# Patient Record
Sex: FEMALE | Race: WHITE | NOT HISPANIC OR LATINO | Employment: OTHER | URBAN - METROPOLITAN AREA
[De-identification: names, ages, dates, MRNs, and addresses within clinical notes are randomized per-mention and may not be internally consistent; named-entity substitution may affect disease eponyms.]

---

## 2024-03-05 PROCEDURE — 88368 INSITU HYBRIDIZATION MANUAL: CPT

## 2024-03-05 PROCEDURE — 88305 TISSUE EXAM BY PATHOLOGIST: CPT

## 2024-03-05 PROCEDURE — 88305 TISSUE EXAM BY PATHOLOGIST: CPT | Performed by: STUDENT IN AN ORGANIZED HEALTH CARE EDUCATION/TRAINING PROGRAM

## 2024-03-05 PROCEDURE — 88368 INSITU HYBRIDIZATION MANUAL: CPT | Performed by: STUDENT IN AN ORGANIZED HEALTH CARE EDUCATION/TRAINING PROGRAM

## 2024-03-12 ENCOUNTER — LAB REQUISITION (OUTPATIENT)
Dept: LAB | Facility: HOSPITAL | Age: 79
End: 2024-03-12
Payer: COMMERCIAL

## 2024-03-25 LAB
CLINICAL SIG UPDATED INFO: NORMAL
LAB AP ASR DISCLAIMER: NORMAL
LABORATORY COMMENT REPORT: NORMAL
PATH REPORT.FINAL DX SPEC: NORMAL
PATH REPORT.TOTAL CANCER: NORMAL

## 2024-05-03 PROCEDURE — 77300 RADIATION THERAPY DOSE PLAN: CPT | Performed by: RADIOLOGY

## 2024-05-03 PROCEDURE — 77295 3-D RADIOTHERAPY PLAN: CPT | Performed by: RADIOLOGY

## 2024-05-03 PROCEDURE — 77334 RADIATION TREATMENT AID(S): CPT | Performed by: RADIOLOGY

## 2024-05-06 PROCEDURE — 77280 THER RAD SIMULAJ FIELD SMPL: CPT | Performed by: RADIOLOGY

## 2024-05-06 PROCEDURE — 77427 RADIATION TX MANAGEMENT X5: CPT | Performed by: RADIOLOGY

## 2024-05-07 PROCEDURE — 77331 SPECIAL RADIATION DOSIMETRY: CPT | Performed by: RADIOLOGY

## 2024-05-07 PROCEDURE — 77014 CHG CT GUIDANCE RADIATION THERAPY FLDS PLACEMENT: CPT | Performed by: RADIOLOGY

## 2024-05-08 PROCEDURE — 77014 CHG CT GUIDANCE RADIATION THERAPY FLDS PLACEMENT: CPT | Performed by: RADIOLOGY

## 2024-05-09 PROCEDURE — 77014 CHG CT GUIDANCE RADIATION THERAPY FLDS PLACEMENT: CPT | Performed by: RADIOLOGY

## 2024-05-10 PROCEDURE — 77014 CHG CT GUIDANCE RADIATION THERAPY FLDS PLACEMENT: CPT | Performed by: RADIOLOGY

## 2024-08-21 NOTE — PROGRESS NOTES
History of Present Illness:  Elicia Elizabeth is a 78 y.o. female with a personal history of cancer.  Elicia Elizabeth was referred to the Cancer Genetics Clinic at [facility] by Dr. Stone at [facility]. Elicia Elizabeth is interested in genetic testing to clarify their personal risk for cancer, as well as the risks to their family members.    Cancer Medical History:  Personal history of cancer? Yes   Type: Breast     Age at diagnosis: 78  Summary: Ms. Elizabeth was diagnosed on 3/6/24 with right breast IDC (ER/OR+, HER2-). She is s/p right lumpectomy on 3/25/24. She reports having 5 days of RT. She did not have chemo and deferred AI.    History of other cancers: Yes, per patient she has a distant past (~, age 60) of left breast cancer (treated in [place], [place]) and is s/p surgery, RT, and 5 years of AI.    Prior genetic testing? No     Cancer screening history:  Colonoscopy? Yes. Per patient she reports 2 colonoscopies. She reports a cumulative history of < or = 5 polyps (no records to confirm)   Other cancer screening? Ms. Elizabeth reports a h/o BSO (h/o PCOS). She reports a h/o ~1 year of fertility treatments    Other potential exposures:  No known JOSE exposure    Family history:  A 4-generation pedigree was obtained and was significant for the following:   Two sons (living, ages 49 and 45) and one daughter (living, 42) without cancer history  Sister (living, 70) with a h/o thyroid cancer at an age <50 (s/p thyroidectomy and radiation)  4 other sisters (all living ages 71-76) without cancer history  Mother (, 80) without cancer history  3+ maternal aunts who lived >50 without cancer history (note we have limited information on maternal aunts/uncles)  Both maternal grandparents lived >50 without cancer history  Father (, 74) without cancer history  Two paternal aunts who lived >50 without cancer history  Paternal grandmother lived >50 without cancer history  Maternal ancestry is English.  Paternal ancestry is  English. There is no known Ash[employer]azi [employer] ancestry. Consanguinity was denied.       Discussion:  Elicia Elizabeth is a 78 y.o. old female with a personal history of breast cancer.  Based on her personal history of bilateral breast cancers at any age, Elicia Elizabeth meets NCCN criteria for testing of the BRCA1 and BRCA2 genes. She is interested in testing, which is recommended, and was ordered today via the 71-gene CancerNext-Expanded + [employer] panel from [employer]. Our discussion is summarized below.    We reviewed genes and chromosomes, inherited forms of breast and ovarian cancer, and the BRCA1 and BRCA2 genes causing HBOC. We discussed that most cancers are not due to an inherited genetic susceptibility. However, in about 5-10% of families, there is an inherited genetic mutation that can make a person more susceptible to developing certain forms of cancer. Within these families, we often see multiple family members with cancer, occurring in multiple generations. In addition, earlier onset and bilateral cancers are suggestive of an inherited form of cancer. Finally, there is a clustering of certain types of cancer in these families, such as breast and ovarian cancer.    We discussed the BRCA1 and BRCA2 genes, which are two genes that have been linked to early-onset breast and/or ovarian cancer. Changes in these genes (sometimes referred to as mutations) are inherited in a dominant pattern and confer >60% lifetime risk for breast cancer. This is elevated compared to the general population risk of 13%. In addition, BRCA1 and BRCA2 mutation carriers have up to a 58% lifetime risk for ovarian cancer, which is elevated over the 1.3% general population risk. Mutation carriers who have already been diagnosed with cancer have an increased risk to develop a second, contralateral breast cancer. BRCA2 gene mutation carriers have an increased risk for male breast cancer, prostate cancer, melanoma and pancreatic cancer.  Please note that our risk estimates and recommendations only go until the age of 75. After that, all care should be individualized with your providers.    We discussed that there are multiple genes associated with increased breast cancer risk. Some genes, like the BRCA1 and BRCA2 genes, are considered highly penetrant breast cancer genes, meaning a mutation in the gene confers a high risk of breast cancer. Additionally, there are other intermediate (moderate risk) breast cancer genes. For some of the moderate risk genes, there is often limited information regarding the degree to which a mutation in the gene affects risk of different types of cancers. Additionally, for some of these moderate risk genes, the appropriate management for individuals who have a mutation in one of these genes is not always clear. Our knowledge about the cancer risks associated with mutations in these moderate risk genes is always growing, and we will likely be able to provide more comprehensive information in the future.     Gene mutations in most cancer risk genes, i.e. BRCA1 and BRCA2, are inherited in an autosomal dominant fashion. This means that if an individual has a change in either of these genes, their siblings, parents, and children have a 50% chance of also having that gene change and a 50% chance of not having the gene change.     We reviewed the three results we can get back:  1. Positive- Identified a change in a cancer gene that confers an increased cancer risk. We will discuss potential changes in management for her and her family based on the specific gene mutation found.  2. Negative-Clears her for the cancer predisposition syndrome we assessed, but cannot clear her for all cancer predisposition risks. She would continue to be screened based on her personal and family history.  3. Variant of Uncertain Significance (VUS) - We discussed should an uncertain result come back that this would be treated like a negative result  (i.e. no management recommendation will be made no familial variant testing) as the implications of this finding are currently unknown.    Lastly, we discussed the Genetic Information Non-discrimination Act ([place]) of 2008. We discussed that per this federal law, employers (at companies with 15 employees or greater) and health insurance companies (barring  and other  insurances) are forbidden to ask for and use genetic information against another person. As such, health insurance companies cannot ask for genetic information and use findings affect coverage or rates. However, luxury insurances such as life insurance, long term care insurance, and/or private disability insurance companies are not forbidden against using genetic information when an individual takes out a new/additional policy in one of those areas. As such, for unaffected individuals it could be beneficial to explore/take out policies in luxury insurance areas PRIOR to undergoing genetic testing.    Elicia Elizabeth was counseled about hereditary cancer susceptibility including cancer risks, options for increased screening and/or risk reduction, genetic testing, and the implications for other family members. We discussed performing genetic testing in the context of a multi-gene panel test that looks at the BRCA1 and BRCA2, as well as moderate penetrant genes. She is interested in this approach, which is recommended and was ordered today via the 71-gene CancerNext-Expanded + [employer] panel from [employer].    Results are typically available within 4 weeks, and Elicia Elizabeth will return to the Cancer Genetics Clinic to discuss her testing results. At that time, we will make recommendations for both Elicia Elizabeth and her family members in terms of cancer screening and/or cancer risk reduction options.         PLAN:  1.  Elicia Elizabeth elected to undergo genetic testing via a panel test that analyzes 71 genes associated with breast, ovarian and  other cancer risks. Consent for testing was signed and blood was drawn. The sample was sent to [employer] [facility] for analysis. Results are typically available in 4 weeks.    2. Elicia Elizabeth will return to the Cancer Genetics Clinic in approximately 4 weeks to discuss her test results.     3. We remain available to Elicia Elizabeth or her family members at [phone] if any questions arise regarding information discussed at today's visit.    Anahi Bhakta MS, [facility]  Certified Genetic Counselor  [facility]  [phone]    Reviewed by:  Dr. Judi Yañez  Clinical   [facility]  [phone]

## 2024-08-30 ENCOUNTER — CLINICAL SUPPORT (OUTPATIENT)
Dept: GENETICS | Facility: CLINIC | Age: 79
End: 2024-08-30
Payer: COMMERCIAL

## 2024-08-30 ENCOUNTER — LAB (OUTPATIENT)
Dept: LAB | Facility: CLINIC | Age: 79
End: 2024-08-30
Payer: COMMERCIAL

## 2024-08-30 VITALS
BODY MASS INDEX: 34.93 KG/M2 | WEIGHT: 203.48 LBS | RESPIRATION RATE: 16 BRPM | HEART RATE: 71 BPM | DIASTOLIC BLOOD PRESSURE: 71 MMHG | OXYGEN SATURATION: 90 % | SYSTOLIC BLOOD PRESSURE: 110 MMHG | TEMPERATURE: 96.8 F

## 2024-08-30 DIAGNOSIS — C50.911 MALIGNANT NEOPLASM OF RIGHT BREAST IN FEMALE, ESTROGEN RECEPTOR POSITIVE, UNSPECIFIED SITE OF BREAST (MULTI): ICD-10-CM

## 2024-08-30 DIAGNOSIS — Z71.83 ENCOUNTER FOR NONPROCREATIVE GENETIC COUNSELING: ICD-10-CM

## 2024-08-30 DIAGNOSIS — Z85.3 PERSONAL HISTORY OF MALIGNANT NEOPLASM OF BREAST: ICD-10-CM

## 2024-08-30 DIAGNOSIS — Z17.0 MALIGNANT NEOPLASM OF RIGHT BREAST IN FEMALE, ESTROGEN RECEPTOR POSITIVE, UNSPECIFIED SITE OF BREAST (MULTI): ICD-10-CM

## 2024-08-30 PROCEDURE — 36415 COLL VENOUS BLD VENIPUNCTURE: CPT

## 2024-08-30 PROCEDURE — 96040 PR MEDICAL GENETICS COUNSELING EACH 30 MINUTES: CPT

## 2024-08-30 PROCEDURE — 96040 HC GENETIC COUNSELING, EACH 30 MIN: CPT

## 2024-08-30 RX ORDER — DAPAGLIFLOZIN 10 MG/1
10 TABLET, FILM COATED ORAL DAILY
COMMUNITY

## 2024-08-30 RX ORDER — OMEPRAZOLE 20 MG/1
1 CAPSULE, DELAYED RELEASE ORAL
COMMUNITY
Start: 2024-06-08

## 2024-08-30 RX ORDER — LEVOTHYROXINE SODIUM 75 UG/1
75 TABLET ORAL DAILY
COMMUNITY

## 2024-08-30 RX ORDER — SEMAGLUTIDE 0.68 MG/ML
INJECTION, SOLUTION SUBCUTANEOUS
COMMUNITY

## 2024-08-30 RX ORDER — GLIMEPIRIDE 1 MG/1
1 TABLET ORAL DAILY
COMMUNITY

## 2024-08-30 RX ORDER — PROPRANOLOL HYDROCHLORIDE 10 MG/1
10 TABLET ORAL
COMMUNITY

## 2024-08-30 ASSESSMENT — PAIN SCALES - GENERAL: PAINLEVEL: 0-NO PAIN

## 2024-09-10 ENCOUNTER — TELEPHONE (OUTPATIENT)
Dept: GENETICS | Facility: CLINIC | Age: 79
End: 2024-09-10
Payer: COMMERCIAL

## 2024-09-10 LAB
COMMENTS - MP RESULT TYPE: NORMAL
SCAN RESULT: NORMAL

## 2024-09-10 NOTE — TELEPHONE ENCOUNTER
Called patient regarding request from [employer] for the patient's insurance information. Requested patient call back to my direct line to confirm insurance.

## 2024-09-12 NOTE — TELEPHONE ENCOUNTER
Patient returned call from 9/10. Explained that Karen needed an updated copy of her insurance card. I will send the patient a [employer] message, which they will respond to with a picture of the front and back of their insurance card.

## 2024-09-18 NOTE — PROGRESS NOTES
Below is a summary of what we discussed at your appointment.    - Elicia Elizabeth is a 78 y.o. female with a personal history of bilateral breast cancers.  - Elicia Elizabeth was initially seen in the Cancer Genetics Clinic on 8/30/24 for counseling and coordination of testing.    - Based on her history, the 71 gene CancerNext-Expanded +[employer] Panel from [employer] was recommended and ordered.  - I called her to review the results of this testing, and our discussion is summarized below.    - Elicia Elizabeth's results were NEGATIVE, meaning that no disease causing mutations were identified.  - A genetic cause for her history of cancer has not been identified.    - Your history may still suggest hereditary breast cancer. This could be due to a mutation in a gene which is not currently detectable, or due to a mutation in a different gene that is yet to be associated with hereditary breast cancer. More testing in the future may help to determine this.    - It was previously discussed that women with BRCA1 and BRCA2 mutations have an increased risk for ovarian cancer. With negative test results and no family history of ovarian cancer, she is not considered at increased risk for this cancer type from a genetic standpoint. Prophylactic surgery is not indicated from a genetic standpoint.    - You should continue to follow with your oncology providers for all breast cancer treatment and surveillance recommendations. You should also follow with your primary care providers for all other age-related cancer screenings, such as Pap smears, colonoscopies, etc.    - Regarding your children, if there is no significant history of cancer on their father's side, no genetic testing is recommended for them at this time, since your test results did not show anything they need to be tested for.    - Although her results were negative, Elicia Elizabeth's female relatives are considered to have an increased risk to develop breast cancer over the  general population, based on the family history alone.   - They should speak to their healthcare providers about their breast cancer screening protocols, as they may be a candidate for annual breast MRIs, in addition to an annual mammogram and clinic breast exam.   - Breast cancer screening should also begin 10 years younger than the youngest diagnosis in the family, or by age 40, whichever comes first.   - This would be 40 for Ms. Elizabeth' family.    - Our understanding of genetic contribution to cancer diagnoses is always evolving, so there may be additional testing recommended in the future. She can contact us in 2-3 years to determine if there have been any changes since our discussion today.      Anahi Bhakta MS, [facility]  Certified Genetic Counselor  [facility]  [phone]    Reviewed by:  Dr. Judi Yañez  Clinical   [facility]  [phone]

## 2024-09-27 ENCOUNTER — CLINICAL SUPPORT (OUTPATIENT)
Dept: GENETICS | Facility: CLINIC | Age: 79
End: 2024-09-27
Payer: COMMERCIAL

## 2024-09-27 VITALS
WEIGHT: 206.13 LBS | HEART RATE: 70 BPM | OXYGEN SATURATION: 90 % | TEMPERATURE: 97.2 F | BODY MASS INDEX: 35.38 KG/M2 | RESPIRATION RATE: 16 BRPM | SYSTOLIC BLOOD PRESSURE: 124 MMHG | DIASTOLIC BLOOD PRESSURE: 64 MMHG

## 2024-09-27 DIAGNOSIS — Z71.83 ENCOUNTER FOR NONPROCREATIVE GENETIC COUNSELING: Primary | ICD-10-CM

## 2024-09-27 DIAGNOSIS — C50.911 MALIGNANT NEOPLASM OF RIGHT BREAST IN FEMALE, ESTROGEN RECEPTOR POSITIVE, UNSPECIFIED SITE OF BREAST: ICD-10-CM

## 2024-09-27 DIAGNOSIS — Z17.0 MALIGNANT NEOPLASM OF RIGHT BREAST IN FEMALE, ESTROGEN RECEPTOR POSITIVE, UNSPECIFIED SITE OF BREAST: ICD-10-CM

## 2024-09-27 DIAGNOSIS — Z85.3 PERSONAL HISTORY OF MALIGNANT NEOPLASM OF BREAST: ICD-10-CM

## 2024-09-27 PROCEDURE — GENMD PR GENETICS VISIT (MEDICAID/MEDICARE)

## 2024-09-27 ASSESSMENT — PAIN SCALES - GENERAL: PAINLEVEL: 0-NO PAIN
